# Patient Record
(demographics unavailable — no encounter records)

---

## 2025-02-12 NOTE — ASSESSMENT
[FreeTextEntry1] : Labs reviewed.  75-year-old male with BPH without bothersome LUTS. Will send UA, UC, and Cytology. Continue Flomax and Finasteride.  For PSA Screening MAURA Benign. Last PSA 1.36 and with correction for finasteride 2.72. Will obtain PSA level.   Return to office in 1 year or sooner if any issues.

## 2025-02-12 NOTE — HISTORY OF PRESENT ILLNESS
[FreeTextEntry1] : 75-year-old man presents for follow up. Taking Flomax and Finasteride reports good stream and denies bothersome LUTS.

## 2025-02-12 NOTE — PHYSICAL EXAM
[Normal Appearance] : normal appearance [Well Groomed] : well groomed [General Appearance - In No Acute Distress] : no acute distress [] : no respiratory distress [Respiration, Rhythm And Depth] : normal respiratory rhythm and effort [Exaggerated Use Of Accessory Muscles For Inspiration] : no accessory muscle use [Abdomen Soft] : soft [Abdomen Tenderness] : non-tender [Urinary Bladder Findings] : the bladder was normal on palpation [Prostate Tenderness] : the prostate was not tender [No Prostate Nodules] : no prostate nodules [Normal Station and Gait] : the gait and station were normal for the patient's age [No Focal Deficits] : no focal deficits [Oriented To Time, Place, And Person] : oriented to person, place, and time [Affect] : the affect was normal [Mood] : the mood was normal [Chaperone Present] : A chaperone was present in the examining room during all aspects of the physical examination [FreeTextEntry2] : Hillary Tam

## 2025-05-08 NOTE — END OF VISIT
[FreeTextEntry3] : Recommendation: The catheter will not be removed for another week. Antibiotics will be started upon removal. Drainage reservoirs were discussed with the patient and his daughter to include belly and leg bags as he currently has a bed-drain bag. The patient will follow up.

## 2025-05-08 NOTE — ADDENDUM
[FreeTextEntry1] : I, Marychuy Arellano, acted as a scribe on behalf of Dr. Caba 05/08/2025.   All medical record entries made by the Scribe were at my, Dr. Caba, direction and personally dictated by me on 05/08/2025. I have reviewed the chart and agree that the record accurately reflects my personal performance of the history, physical exam, assessment, and plan. I have also personally directed, reviewed, and agreed with the chart.

## 2025-05-08 NOTE — HISTORY OF PRESENT ILLNESS
[FreeTextEntry1] : RAFAT SIDHU is a 76 year old male presenting with his daughter for bleeding s/p TRUSP. ~5 weeks ago, a TRUSP was performed at a different location. He was admitted to different hospitals twice for removal of clots and fungal infections. Today, his catheter drainage is clear, however, his daughter showed a video of the reservoir containing bloody discharge without clots. The patient is on Xarelto and Aspirin but discontinued Plavix some time ago.

## 2025-05-21 NOTE — HISTORY OF PRESENT ILLNESS
[FreeTextEntry1] : This patient presents for a follow up with his daughter. Patient is status post TURP with a different urology and was admitted to the hospital twice of clot evacuation and fungal urinary infection. He continues to have intermittent hematuria but reports overall his urine is clearing up. His post void residual check today showed 44 cc.

## 2025-05-21 NOTE — ASSESSMENT
[FreeTextEntry1] : Patient is doing well and will return to the office annually or sooner as needed.

## 2025-06-09 NOTE — ASSESSMENT
[FreeTextEntry1] : Persistent postprocedural hematuria secondary to the fact that he remains on Xarelto

## 2025-06-09 NOTE — HISTORY OF PRESENT ILLNESS
[FreeTextEntry1] : This patient is here today as he notes intermittent hematuria.  He remain but his brother is with him and they note that the hematuria is basically improving.